# Patient Record
Sex: MALE | Race: WHITE | HISPANIC OR LATINO | URBAN - NONMETROPOLITAN AREA
[De-identification: names, ages, dates, MRNs, and addresses within clinical notes are randomized per-mention and may not be internally consistent; named-entity substitution may affect disease eponyms.]

---

## 2022-01-25 ENCOUNTER — WEB ENCOUNTER (OUTPATIENT)
Dept: URBAN - NONMETROPOLITAN AREA CLINIC 4 | Facility: CLINIC | Age: 57
End: 2022-01-25

## 2022-01-25 ENCOUNTER — OFFICE VISIT (OUTPATIENT)
Dept: URBAN - NONMETROPOLITAN AREA CLINIC 4 | Facility: CLINIC | Age: 57
End: 2022-01-25
Payer: COMMERCIAL

## 2022-01-25 DIAGNOSIS — Z12.11 COLON CANCER SCREENING: ICD-10-CM

## 2022-01-25 DIAGNOSIS — R76.8 HEPATITIS C ANTIBODY TEST POSITIVE: ICD-10-CM

## 2022-01-25 PROCEDURE — 99204 OFFICE O/P NEW MOD 45 MIN: CPT | Performed by: INTERNAL MEDICINE

## 2022-01-25 PROCEDURE — 76705 ECHO EXAM OF ABDOMEN: CPT | Performed by: INTERNAL MEDICINE

## 2022-01-25 PROCEDURE — 76700 US EXAM ABDOM COMPLETE: CPT | Performed by: INTERNAL MEDICINE

## 2022-01-25 NOTE — HPI-TODAY'S VISIT:
Patient with hx of lymphoma in 2000.  Pt with no prior tx of hep C.  Pt reports that he was hospitalized n 2010 with acute elevation in liver enz dx . Pt with +social etoh.  No weight loss.  Pt with no pruritus or myalgias. No rashes.    Pt has not had screening colon.

## 2022-01-27 LAB
A/G RATIO: 1.5
ALBUMIN: 4.7
ALKALINE PHOSPHATASE: 71
ALPHA 2-MACROGLOBULINS, QN: 348
ALT (SGPT) P5P: 94
ALT (SGPT): 76
APOLIPOPROTEIN A-1: 154
APTT: 24
AST (SGOT): 62
BASO (ABSOLUTE): 0
BASOS: 1
BILIRUBIN, TOTAL: 0.7
BILIRUBIN, TOTAL: 0.8
BUN/CREATININE RATIO: 17
BUN: 16
CALCIUM: 9.4
CARBON DIOXIDE, TOTAL: 26
CHLORIDE: 103
COMMENT:: (no result)
CREATININE: 0.94
EGFR IF AFRICN AM: 104
EGFR IF NONAFRICN AM: 90
EOS (ABSOLUTE): 0.1
EOS: 1
FIBROSIS SCORE: 0.59
FIBROSIS SCORING:: (no result)
FIBROSIS STAGE: (no result)
GGT: 53
GLOBULIN, TOTAL: 3.1
GLUCOSE: 93
HAPTOGLOBIN: 149
HBSAG SCREEN: NEGATIVE
HCV GENOTYPE: (no result)
HCV LOG10: 6.12
HEMATOCRIT: 43.7
HEMATOLOGY COMMENTS:: (no result)
HEMOGLOBIN: 14.6
HEP B CORE AB, TOT: NEGATIVE
HEPATITIS C GENOTYPE: (no result)
HEPATITIS C QUANTITATION: (no result)
IMMATURE CELLS: (no result)
IMMATURE GRANS (ABS): 0
IMMATURE GRANULOCYTES: 0
INR: 1.1
INTERPRETATIONS:: (no result)
LIMITATIONS:: (no result)
LYMPHS (ABSOLUTE): 1.4
LYMPHS: 29
Lab: (no result)
Lab: (no result)
MCH: 30
MCHC: 33.4
MCV: 90
MONOCYTES(ABSOLUTE): 0.5
MONOCYTES: 11
NECROINFLAMM ACTIVITY SCORING:: (no result)
NECROINFLAMMAT ACTIVITY GRADE: (no result)
NECROINFLAMMAT ACTIVITY SCORE: 0.65
NEUTROPHILS (ABSOLUTE): 2.7
NEUTROPHILS: 58
NRBC: (no result)
PLATELETS: 141
POTASSIUM: 4
PROTEIN, TOTAL: 7.8
PROTHROMBIN TIME: 11.4
RBC: 4.86
RDW: 11.9
SODIUM: 142
TEST INFORMATION:: (no result)
WBC: 4.7

## 2022-01-28 ENCOUNTER — TELEPHONE ENCOUNTER (OUTPATIENT)
Dept: URBAN - METROPOLITAN AREA CLINIC 92 | Facility: CLINIC | Age: 57
End: 2022-01-28

## 2022-04-12 ENCOUNTER — OFFICE VISIT (OUTPATIENT)
Dept: URBAN - NONMETROPOLITAN AREA CLINIC 4 | Facility: CLINIC | Age: 57
End: 2022-04-12

## 2022-04-13 ENCOUNTER — TELEPHONE ENCOUNTER (OUTPATIENT)
Dept: URBAN - METROPOLITAN AREA CLINIC 92 | Facility: CLINIC | Age: 57
End: 2022-04-13

## 2022-04-13 ENCOUNTER — OFFICE VISIT (OUTPATIENT)
Dept: URBAN - NONMETROPOLITAN AREA CLINIC 4 | Facility: CLINIC | Age: 57
End: 2022-04-13
Payer: COMMERCIAL

## 2022-04-13 DIAGNOSIS — Z12.11 ENCOUNTER FOR SCREENING COLONOSCOPY: ICD-10-CM

## 2022-04-13 DIAGNOSIS — B18.2 CHRONIC HEPATITIS C WITHOUT HEPATIC COMA: ICD-10-CM

## 2022-04-13 PROBLEM — 128302006: Status: ACTIVE | Noted: 2022-04-13

## 2022-04-13 PROCEDURE — 91200 LIVER ELASTOGRAPHY: CPT | Performed by: INTERNAL MEDICINE

## 2022-04-13 PROCEDURE — 99214 OFFICE O/P EST MOD 30 MIN: CPT | Performed by: INTERNAL MEDICINE

## 2022-04-13 RX ORDER — GLECAPREVIR AND PIBRENTASVIR 40; 100 MG/1; MG/1
3 TABLETS TABLET, FILM COATED ORAL ONCE A DAY
OUTPATIENT
Start: 2022-04-13 | End: 2022-06-08

## 2022-04-13 RX ORDER — GLECAPREVIR AND PIBRENTASVIR 40; 100 MG/1; MG/1
3 TABLETS TABLET, FILM COATED ORAL ONCE A DAY
Qty: 168 TABLET | Refills: 0 | OUTPATIENT
Start: 2022-04-13 | End: 2022-06-08

## 2022-04-13 RX ORDER — VELPATASVIR AND SOFOSBUVIR 100; 400 MG/1; MG/1
1 TABLET TABLET, FILM COATED ORAL ONCE A DAY
Qty: 84 TABLET | Refills: 0 | OUTPATIENT
Start: 2022-04-14 | End: 2022-07-07

## 2022-04-13 RX ORDER — SODIUM SULFATE, MAGNESIUM SULFATE, AND POTASSIUM CHLORIDE 17.75; 2.7; 2.25 G/1; G/1; G/1
12 TABLETS TABLET ORAL
Qty: 24 TABLETS | Refills: 0 | OUTPATIENT
Start: 2022-04-13 | End: 2022-04-14

## 2022-04-13 NOTE — HPI-TODAY'S VISIT:
Follow Up 4/13/22: Patient presents for liver related opinion. He has HCV [place] Ib, VL 1.3 M IU/ml. He is treatment naive. USG was normal but Fibrosure suggested F3 disease. Risk factors are unknown. No signs/symptoms of liver disease. He denies easy bleeding or bruising. He was diagnosed with NHL s/p chemo under remission x 12 years.

## 2022-04-25 ENCOUNTER — TELEPHONE ENCOUNTER (OUTPATIENT)
Dept: URBAN - METROPOLITAN AREA CLINIC 92 | Facility: CLINIC | Age: 57
End: 2022-04-25

## 2022-09-19 ENCOUNTER — DASHBOARD ENCOUNTERS (OUTPATIENT)
Age: 57
End: 2022-09-19

## 2022-09-28 ENCOUNTER — OFFICE VISIT (OUTPATIENT)
Dept: URBAN - NONMETROPOLITAN AREA CLINIC 4 | Facility: CLINIC | Age: 57
End: 2022-09-28